# Patient Record
Sex: FEMALE | Race: WHITE | URBAN - METROPOLITAN AREA
[De-identification: names, ages, dates, MRNs, and addresses within clinical notes are randomized per-mention and may not be internally consistent; named-entity substitution may affect disease eponyms.]

---

## 2017-01-23 ENCOUNTER — OFFICE VISIT (OUTPATIENT)
Dept: FAMILY MEDICINE | Facility: OTHER | Age: 27
End: 2017-01-23
Payer: COMMERCIAL

## 2017-01-23 VITALS
OXYGEN SATURATION: 97 % | WEIGHT: 231.4 LBS | RESPIRATION RATE: 18 BRPM | SYSTOLIC BLOOD PRESSURE: 110 MMHG | DIASTOLIC BLOOD PRESSURE: 68 MMHG | HEART RATE: 91 BPM | BODY MASS INDEX: 38.55 KG/M2 | TEMPERATURE: 97.9 F | HEIGHT: 65 IN

## 2017-01-23 DIAGNOSIS — R07.0 THROAT PAIN: ICD-10-CM

## 2017-01-23 DIAGNOSIS — J06.9 VIRAL URI WITH COUGH: Primary | ICD-10-CM

## 2017-01-23 LAB
DEPRECATED S PYO AG THROAT QL EIA: NORMAL
MICRO REPORT STATUS: NORMAL
SPECIMEN SOURCE: NORMAL

## 2017-01-23 PROCEDURE — 99213 OFFICE O/P EST LOW 20 MIN: CPT | Performed by: STUDENT IN AN ORGANIZED HEALTH CARE EDUCATION/TRAINING PROGRAM

## 2017-01-23 PROCEDURE — 87880 STREP A ASSAY W/OPTIC: CPT | Performed by: STUDENT IN AN ORGANIZED HEALTH CARE EDUCATION/TRAINING PROGRAM

## 2017-01-23 RX ORDER — AZITHROMYCIN 250 MG/1
TABLET, FILM COATED ORAL
Qty: 6 TABLET | Status: CANCELLED | OUTPATIENT
Start: 2017-01-23

## 2017-01-23 RX ORDER — DOXYCYCLINE 100 MG/1
100 CAPSULE ORAL 2 TIMES DAILY
Qty: 28 CAPSULE | Refills: 0 | Status: CANCELLED | OUTPATIENT
Start: 2017-01-23 | End: 2017-02-06

## 2017-01-23 ASSESSMENT — PAIN SCALES - GENERAL: PAINLEVEL: MODERATE PAIN (5)

## 2017-01-23 NOTE — NURSING NOTE
"Chief Complaint   Patient presents with     Cough     Sinus Problem     Pharyngitis       Initial /68 mmHg  Pulse 91  Temp(Src) 97.9  F (36.6  C) (Oral)  Resp 18  Ht 5' 5.32\" (1.659 m)  Wt 231 lb 6.4 oz (104.962 kg)  BMI 38.14 kg/m2  SpO2 97%  LMP 01/11/2017 (Exact Date) Estimated body mass index is 38.14 kg/(m^2) as calculated from the following:    Height as of this encounter: 5' 5.32\" (1.659 m).    Weight as of this encounter: 231 lb 6.4 oz (104.962 kg).  BP completed using cuff size: eliseo Knutson CMA      "

## 2017-01-23 NOTE — MR AVS SNAPSHOT
After Visit Summary   1/23/2017    Katia Izaguirre    MRN: [id number]           Patient Information     Date Of Birth          1990        Visit Information        Provider Department      1/23/2017 12:50 PM Christiane Peterson APRN [facility]        Today's Diagnoses     Throat pain    -  1       Care Instructions        1.  Stay hydrated and rest - Drink plenty of fluid, 8-10 glasses of water per day and get lots of rest.    2. Mucinex - you can try Mucinex (immediate release) 200-400 mg every 4 hours OR Mucinex (extended release) 600-1200 mg every 12 hours as needed for congestion.  Mucinex helps thin out your mucus so that it is easier to get it out of your body.    3.  Flonase - 1 spray in each nostril daily.  This is a steroid nasal spray that can help decrease inflammation in the nose and sinuses resulting in less nasal congestion.    4. Oral decongestants - you may take a nasal decongestant for up to 3 days as needed.  Sudafed (pseudoephedrine) can be used if you do not have high blood pressure.  The dosing for Sudafed is 60 mg every 4-6 hours as needed for immediate release.  For extended release it is 120 mg every 12 hours.  If you have high blood pressure you can try Coricidin HBP.    5. Nasal decongestant - Afrin nasal spray 2-3 sprays in each nostril twice daily for up to 3 days can help improve nasal congestion.  Do not use longer than 3 days as congestion can worsen with prolonged use.    6. Humidifier - use a humidifier in the places you spend the most time.  This will help moisturize the air you breath in and help improve cough.    7. Headache, sore throat, fever - you can try ibuprofen (maximum of 2400 mg/24 hours) or Tylenol (maximum of 3,000 mg/24 hours).    - do not use Tylenol if you have liver disease  - do not use Ibuprofen if you have kidney disease or take a blood thinner.    8. Vicks VapoRub or Mentholatum - you may try rubbing this on your  "chest, under your nose and over the top of your nose for congestion.  Avoid contact with eyes, mouth and inside of nose.    9. Sore throat - gargle with warm salt water as needed.  Ibuprofen can help.  Chloraseptic throat lozenges or spray can help numb the throat.       If your symptoms worsen or do not improve after lasting 10 days, please return to the clinic.    Kriss Peterson CNP  [phone]                  Follow-ups after your visit        Who to contact     If you have questions or need follow up information about today's clinic visit or your schedule please contact [facility] directly at [phone].  Normal or non-critical lab and imaging results will be communicated to you by MyChart, letter or phone within 4 business days after the clinic has received the results. If you do not hear from us within 7 days, please contact the clinic through [employer] or phone. If you have a critical or abnormal lab result, we will notify you by phone as soon as possible.  Submit refill requests through [employer] or call your pharmacy and they will forward the refill request to us. Please allow 3 business days for your refill to be completed.          Additional Information About Your Visit        [employer] Information     [employer] gives you secure access to your electronic health record. If you see a primary care provider, you can also send messages to your care team and make appointments. If you have questions, please call your primary care clinic.  If you do not have a primary care provider, please call [phone] and they will assist you.        Care EveryWhere ID     This is your Care EveryWhere ID. This could be used by other organizations to access your [facility] medical records  [id number]        Your Vitals Were     Pulse Temperature Respirations    91 97.9  F (36.6  C) (Oral) 18    Height BMI (Body Mass Index) Pulse Oximetry    5' 5.32\" (1.659 m) 38.14 kg/m2 97%    Last Period          01/11/2017 " (Exact Date)         Blood Pressure from Last 3 Encounters:   01/23/17 110/68   08/11/16 128/82    Weight from Last 3 Encounters:   01/23/17 231 lb 6.4 oz (104.962 kg)   08/19/16 231 lb 4.8 oz (104.917 kg)   08/11/16 235 lb (106.595 kg)              We Performed the Following     Strep, Rapid Screen        Primary Care Provider Office Phone #    [facility] [phone]       No address on file        Thank you!     Thank you for choosing [facility]  for your care. Our goal is always to provide you with excellent care. Hearing back from our patients is one way we can continue to improve our services. Please take a few minutes to complete the written survey that you may receive in the mail after your visit with us. Thank you!             Your Updated Medication List - Protect others around you: Learn how to safely use, store and throw away your medicines at www.disposemymeds.org.      Notice  As of 1/23/2017  1:12 PM    You have not been prescribed any medications.

## 2017-01-23 NOTE — PROGRESS NOTES
"  SUBJECTIVE:                                                    Katia Izaguirre is a 26 year old female who presents to clinic today for the following health issues:      Acute Illness   Acute illness concerns: Cough, sinus problem, sore throat  Onset: Few days     Fever: YES- off and on    Chills/Sweats: YES    Headache (location?): YES- off and on    Sinus Pressure:YES- post-nasal drainage and facial pain    Conjunctivitis:  no    Ear Pain: no    Rhinorrhea: YES    Congestion: YES    Sore Throat: YES     Cough: YES-with shortness of breath, productive but does not make self cough up sputum    Wheeze: no    Decreased Appetite: YES- Little    Nausea: YES    Vomiting: no    Diarrhea:  YES    Dysuria/Freq.: no    Fatigue/Achiness: YES    Sick/Strep Exposure: YES- dad was sick     Therapies Tried and outcome: Mucinex, blanca mercedezer - does not help      Problem list and histories reviewed & adjusted, as indicated.  Additional history: as documented    Labs reviewed in EPIC  Problem list, Medication list, Allergies, and Medical/Social/Surgical histories reviewed in [facility] and updated as appropriate.    ROS:  Constitutional, HEENT, cardiovascular, pulmonary, gi and gu systems are negative, except as otherwise noted.    OBJECTIVE:                                                    /68 mmHg  Pulse 91  Temp(Src) 97.9  F (36.6  C) (Oral)  Resp 18  Ht 5' 5.32\" (1.659 m)  Wt 231 lb 6.4 oz (104.962 kg)  BMI 38.14 kg/m2  SpO2 97%  LMP 01/11/2017 (Exact Date)  Body mass index is 38.14 kg/(m^2).  GENERAL: healthy, alert and no distress  EYES: Eyes grossly normal to inspection, PERRL and conjunctivae and sclerae normal  HENT: ear canals and TM's normal, nasal passages erythematous, posterior pharynx mildly erythematous, mouth without ulcers or lesions  NECK: no adenopathy, no asymmetry, masses, or scars  RESP: lungs clear to auscultation - no rales, rhonchi or wheezes  CV: regular rate and rhythm, normal S1 S2, no S3 or " S4, no murmur, click or rub  SKIN: no suspicious lesions or rashes  PSYCH: mentation appears normal, affect normal/bright    Diagnostic Test Results:  none      ASSESSMENT/PLAN:                                                      Katia was seen today for cough, sinus problem and pharyngitis.    Diagnoses and all orders for this visit:    Viral URI with cough    Throat pain  -     Strep, Rapid Screen    Symptoms consistent with viral URI.  Strep negative.  Symptoms started a few days ago so anticipate that patient will start to feel better over the next couple of days.  Reviewed OTC treatments and gave patient handout.  Told patient she may call back if symptoms persist or worsen after 10 days and I will call in an antibiotic.      See Patient Instructions  Patient Instructions       1.  Stay hydrated and rest - Drink plenty of fluid, 8-10 glasses of water per day and get lots of rest.    2. Mucinex - you can try Mucinex (immediate release) 200-400 mg every 4 hours OR Mucinex (extended release) 600-1200 mg every 12 hours as needed for congestion.  Mucinex helps thin out your mucus so that it is easier to get it out of your body.    3.  Flonase - 1 spray in each nostril daily.  This is a steroid nasal spray that can help decrease inflammation in the nose and sinuses resulting in less nasal congestion.    4. Oral decongestants - you may take a nasal decongestant for up to 3 days as needed.  Sudafed (pseudoephedrine) can be used if you do not have high blood pressure.  The dosing for Sudafed is 60 mg every 4-6 hours as needed for immediate release.  For extended release it is 120 mg every 12 hours.  If you have high blood pressure you can try Coricidin HBP.    5. Nasal decongestant - Afrin nasal spray 2-3 sprays in each nostril twice daily for up to 3 days can help improve nasal congestion.  Do not use longer than 3 days as congestion can worsen with prolonged use.    6. Humidifier - use a humidifier in the places  you spend the most time.  This will help moisturize the air you breath in and help improve cough.    7. Headache, sore throat, fever - you can try ibuprofen (maximum of 2400 mg/24 hours) or Tylenol (maximum of 3,000 mg/24 hours).    - do not use Tylenol if you have liver disease  - do not use Ibuprofen if you have kidney disease or take a blood thinner.    8. Vicks VapoRub or Mentholatum - you may try rubbing this on your chest, under your nose and over the top of your nose for congestion.  Avoid contact with eyes, mouth and inside of nose.    9. Sore throat - gargle with warm salt water as needed.  Ibuprofen can help.  Chloraseptic throat lozenges or spray can help numb the throat.       If your symptoms worsen or do not improve after lasting 10 days, please return to the clinic.    Kriss Peterson, ALTAGRACIA  [phone]                  YARITZA Maynard CNP  [facility]

## 2017-01-23 NOTE — PROGRESS NOTES
Quick Note:    Discussed these results with patient in the clinic.    Christiane Peterson, NP-C  [facility]    ______

## 2017-01-23 NOTE — PATIENT INSTRUCTIONS
1.  Stay hydrated and rest - Drink plenty of fluid, 8-10 glasses of water per day and get lots of rest.    2. Mucinex - you can try Mucinex (immediate release) 200-400 mg every 4 hours OR Mucinex (extended release) 600-1200 mg every 12 hours as needed for congestion.  Mucinex helps thin out your mucus so that it is easier to get it out of your body.    3.  Flonase - 1 spray in each nostril daily.  This is a steroid nasal spray that can help decrease inflammation in the nose and sinuses resulting in less nasal congestion.    4. Oral decongestants - you may take a nasal decongestant for up to 3 days as needed.  Sudafed (pseudoephedrine) can be used if you do not have high blood pressure.  The dosing for Sudafed is 60 mg every 4-6 hours as needed for immediate release.  For extended release it is 120 mg every 12 hours.  If you have high blood pressure you can try Coricidin HBP.    5. Nasal decongestant - Afrin nasal spray 2-3 sprays in each nostril twice daily for up to 3 days can help improve nasal congestion.  Do not use longer than 3 days as congestion can worsen with prolonged use.    6. Humidifier - use a humidifier in the places you spend the most time.  This will help moisturize the air you breath in and help improve cough.    7. Headache, sore throat, fever - you can try ibuprofen (maximum of 2400 mg/24 hours) or Tylenol (maximum of 3,000 mg/24 hours).    - do not use Tylenol if you have liver disease  - do not use Ibuprofen if you have kidney disease or take a blood thinner.    8. Vicks VapoRub or Mentholatum - you may try rubbing this on your chest, under your nose and over the top of your nose for congestion.  Avoid contact with eyes, mouth and inside of nose.    9. Sore throat - gargle with warm salt water as needed.  Ibuprofen can help.  Chloraseptic throat lozenges or spray can help numb the throat.       If your symptoms worsen or do not improve after lasting 10 days, please return to the  clinic.    Kriss Peterson, CNP  [phone]

## 2017-02-21 ENCOUNTER — TELEPHONE (OUTPATIENT)
Dept: FAMILY MEDICINE | Facility: OTHER | Age: 27
End: 2017-02-21

## 2017-02-21 NOTE — TELEPHONE ENCOUNTER
Summary:    Patient is due/failing the following:   PAP    Action needed:   Patient needs office visit for PAP.    Type of outreach:    Phone, spoke to patient.  patient declines. Patient will be going elsewhere for this.    Questions for provider review:    None                                                                                                                                    Milagros Villegas       Chart routed to Care Team .        Panel Management Review      Patient has the following on her problem list: None      Composite cancer screening  Chart review shows that this patient is due/due soon for the following None

## 2018-01-07 ENCOUNTER — HEALTH MAINTENANCE LETTER (OUTPATIENT)
Age: 28
End: 2018-01-07

## 2020-03-10 ENCOUNTER — HEALTH MAINTENANCE LETTER (OUTPATIENT)
Age: 30
End: 2020-03-10

## 2020-12-27 ENCOUNTER — HEALTH MAINTENANCE LETTER (OUTPATIENT)
Age: 30
End: 2020-12-27

## 2021-04-24 ENCOUNTER — HEALTH MAINTENANCE LETTER (OUTPATIENT)
Age: 31
End: 2021-04-24

## 2021-10-09 ENCOUNTER — HEALTH MAINTENANCE LETTER (OUTPATIENT)
Age: 31
End: 2021-10-09

## 2022-05-16 ENCOUNTER — HEALTH MAINTENANCE LETTER (OUTPATIENT)
Age: 32
End: 2022-05-16

## 2022-09-11 ENCOUNTER — HEALTH MAINTENANCE LETTER (OUTPATIENT)
Age: 32
End: 2022-09-11

## 2023-06-03 ENCOUNTER — HEALTH MAINTENANCE LETTER (OUTPATIENT)
Age: 33
End: 2023-06-03